# Patient Record
Sex: MALE | Race: WHITE
[De-identification: names, ages, dates, MRNs, and addresses within clinical notes are randomized per-mention and may not be internally consistent; named-entity substitution may affect disease eponyms.]

---

## 2019-07-23 ENCOUNTER — HOSPITAL ENCOUNTER (EMERGENCY)
Dept: HOSPITAL 26 - MED | Age: 44
Discharge: HOME | End: 2019-07-23
Payer: COMMERCIAL

## 2019-07-23 VITALS — WEIGHT: 124 LBS | BODY MASS INDEX: 18.37 KG/M2 | HEIGHT: 69 IN

## 2019-07-23 VITALS — DIASTOLIC BLOOD PRESSURE: 64 MMHG | SYSTOLIC BLOOD PRESSURE: 117 MMHG

## 2019-07-23 VITALS — DIASTOLIC BLOOD PRESSURE: 78 MMHG | SYSTOLIC BLOOD PRESSURE: 133 MMHG
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
## 2019-07-23 DIAGNOSIS — T15.02XA: Primary | ICD-10-CM
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
## 2019-07-23 NOTE — NUR
Patient discharged with v/s stable. Written and verbal after care instructions 
given and explained. 

Patient alert, oriented and verbalized understanding of instructions. 
Ambulatory with steady gait. All questions addressed prior to discharge. ID 
band removed. Patient advised to follow up with PMD. Rx of erythromycin 
ointment/ ibuprofen given. Patient educated on indication of medication 
including possible reaction and side effects. Opportunity to ask questions 
provided and answered.

## 2019-07-23 NOTE — NUR
C/O LEFT EYE REDNESS, SAND PAPER TYPE PAIN, INCREASED TEARS X 3 DAYS

DENIES DIRECT INJURY POSSIBLE FB----NO RUPTURE OR HYPHEMA NOTED